# Patient Record
Sex: MALE | Race: WHITE | Employment: FULL TIME | URBAN - METROPOLITAN AREA
[De-identification: names, ages, dates, MRNs, and addresses within clinical notes are randomized per-mention and may not be internally consistent; named-entity substitution may affect disease eponyms.]

---

## 2023-02-27 ENCOUNTER — HOSPITAL ENCOUNTER (EMERGENCY)
Age: 68
Discharge: HOME | End: 2023-02-27
Attending: EMERGENCY MEDICINE
Payer: MEDICARE

## 2023-02-27 VITALS
HEART RATE: 108 BPM | DIASTOLIC BLOOD PRESSURE: 85 MMHG | HEIGHT: 71 IN | TEMPERATURE: 97.6 F | WEIGHT: 185.63 LBS | RESPIRATION RATE: 16 BRPM | OXYGEN SATURATION: 94 % | BODY MASS INDEX: 25.99 KG/M2 | SYSTOLIC BLOOD PRESSURE: 148 MMHG

## 2023-02-27 DIAGNOSIS — N40.1 URINARY RETENTION DUE TO BENIGN PROSTATIC HYPERPLASIA: Primary | ICD-10-CM

## 2023-02-27 DIAGNOSIS — R33.8 URINARY RETENTION DUE TO BENIGN PROSTATIC HYPERPLASIA: Primary | ICD-10-CM

## 2023-02-27 PROCEDURE — 99283 EMERGENCY DEPT VISIT LOW MDM: CPT

## 2023-02-27 RX ORDER — SULFAMETHOXAZOLE AND TRIMETHOPRIM 800; 160 MG/1; MG/1
TABLET ORAL
COMMUNITY
Start: 2023-02-27

## 2023-02-27 RX ORDER — FINASTERIDE 5 MG/1
TABLET, FILM COATED ORAL
COMMUNITY
Start: 2023-02-27

## 2023-02-27 RX ORDER — TAMSULOSIN HYDROCHLORIDE 0.4 MG/1
CAPSULE ORAL
COMMUNITY
Start: 2023-02-27

## 2023-02-27 NOTE — Clinical Note
[facility]  [address]  [address]  [phone]    Work/School Note    Date: 2/27/2023    To Whom It May concern:    Vernon Nguyen was seen and treated today in the emergency room by the following provider(s):  Attending Provider: Maria Ines Mccarty MD.      Vernon Nguyen is excused from work/school on 02/27/23 and 02/28/23. He is medically clear to return to work/school on 3/1/2023.        Sincerely,          Erik Daigle MD

## 2023-02-27 NOTE — Clinical Note
[facility]  [address]  [address]  [phone]    Work/School Note    Date: 2/27/2023    To Whom It May concern:    Joel Verma was seen and treated today in the emergency room by the following provider(s):  Attending Provider: Kun Simmons MD.      Joel Verma is excused from work/school on 02/27/23 and 02/28/23. He is medically clear to return to work/school on 3/1/2023.        Sincerely,          Mariaa Pennington MD

## 2023-02-28 NOTE — ED TRIAGE NOTES
Triage note:[facility] today dr. Brad Rand started on flowmax has not been able to void well x 4 days was in sever bladder pain x 6 hours and unable to void.

## 2023-02-28 NOTE — ED PROVIDER NOTES
History of Present Illness:  [facility] dr. Talib Brennan started on flowmax has not been able to void well x 4 days was in sever bladder pain x 6 hours and unable to void. History reviewed. No pertinent past medical history. Past Surgical History:   Procedure Laterality Date    HX LUMBAR DISKECTOMY           History reviewed. No pertinent family history. Social History     Socioeconomic History    Marital status: SINGLE     Spouse name: Not on file    Number of children: Not on file    Years of education: Not on file    Highest education level: Not on file   Occupational History    Not on file   Tobacco Use    Smoking status: Never    Smokeless tobacco: Not on file   Substance and Sexual Activity    Alcohol use: Yes    Drug use: No    Sexual activity: Not on file   Other Topics Concern    Not on file   Social History Narrative    Not on file     Social Determinants of Health     Financial Resource Strain: Not on file   Food Insecurity: Not on file   Transportation Needs: Not on file   Physical Activity: Not on file   Stress: Not on file   Social Connections: Not on file   Intimate Partner Violence: Not on file   Housing Stability: Not on file         ALLERGIES: Patient has no known allergies. Review of Systems   Gastrointestinal:  Positive for abdominal distention and abdominal pain. Genitourinary:  Positive for decreased urine volume and difficulty urinating. All other systems reviewed and are negative. Vitals:    02/27/23 2100 02/27/23 2125 02/27/23 2202   BP: (!) 168/92  (!) 148/85   Pulse: (!) 118 (!) 108    Resp: 16     Temp: 97.6 °F (36.4 °C)     SpO2: 99% 94%    Weight: 84.2 kg (185 lb 10 oz)     Height: 5' 11\" (1.803 m)              Physical Exam  Vitals and nursing note reviewed. Constitutional:       General: He is in acute distress. Appearance: Normal appearance. He is normal weight. He is not ill-appearing, toxic-appearing or diaphoretic.    HENT:      Head: Normocephalic and atraumatic. Right Ear: External ear normal.      Left Ear: External ear normal.      Nose: Nose normal. No congestion or rhinorrhea. Mouth/Throat:      Mouth: Mucous membranes are moist.      Pharynx: Oropharynx is clear. No oropharyngeal exudate or posterior oropharyngeal erythema. Eyes:      General: No scleral icterus. Right eye: No discharge. Left eye: No discharge. Extraocular Movements: Extraocular movements intact. Conjunctiva/sclera: Conjunctivae normal.      Pupils: Pupils are equal, round, and reactive to light. Cardiovascular:      Rate and Rhythm: Normal rate and regular rhythm. Pulses: Normal pulses. Heart sounds: No murmur heard. No gallop. Pulmonary:      Effort: Pulmonary effort is normal. No respiratory distress. Breath sounds: Normal breath sounds. No stridor. No wheezing, rhonchi or rales. Chest:      Chest wall: No tenderness. Abdominal:      General: Abdomen is flat. Bowel sounds are normal. There is distension. Palpations: Abdomen is soft. Tenderness: There is abdominal tenderness. There is no guarding or rebound. Genitourinary:     Penis: Normal.       Testes: Normal.   Musculoskeletal:         General: No swelling or tenderness. Normal range of motion. Cervical back: Normal range of motion and neck supple. No rigidity or tenderness. Right lower leg: No edema. Left lower leg: No edema. Lymphadenopathy:      Cervical: No cervical adenopathy. Skin:     General: Skin is warm. Capillary Refill: Capillary refill takes less than 2 seconds. Findings: No erythema or rash. Neurological:      General: No focal deficit present. Mental Status: He is alert and oriented to person, place, and time. Mental status is at baseline. Cranial Nerves: No cranial nerve deficit. Motor: No weakness.       Gait: Gait normal.   Psychiatric:         Mood and Affect: Mood normal.         Behavior: Behavior normal.         Thought Content: Thought content normal.         Judgment: Judgment normal.        No results found for this or any previous visit (from the past 72 hour(s)). No orders to display       Medical Decision Making  Acute urinary retention due to benign prostatic hypertrophy, prostatitis, urinary tract infection and other causes      Problems Addressed:  Urinary retention due to benign prostatic hyperplasia: complicated acute illness or injury    Amount and/or Complexity of Data Reviewed  External Data Reviewed: notes. Details: Prior visits for PCP visits, refills, urinary urgency, sinusitis and anxiety  Discussion of management or test interpretation with external provider(s): The nurse placed a Matta catheter and nearly 1000 cc of urine was drained patient had a dramatic relief    Risk  OTC drugs. Prescription drug management. Decision regarding hospitalization. Minor surgery with no identified risk factors. Risk Details: The patient [facility] and at his [facility]rologist office and culture was also performed. For this reason there is no indication to repeat the urinalysis and the patient is already on antibiotics and was instructed to continue his antibiotics. He was also already on Flomax and finasteride and was instructed to continue these. He has an appointment tomorrow with his urologist which she has been instructed to keep. The patient ultiumately did not warrant hospitalization nor any acute surgical intervention, I considered the need for both. Also considered the need to obtain additional imaging and/or labs beyond what may have been ordered but no additional testing was indicated based on the patient's condition and results of any other testing that may have been performed. Any medications given here and/or prescribed for discharge are documented within the other portions of the note.   After any medications or treatments that may have been provided here the patient was improved and symptoms had resolved or become tolerable or no medications or treatments were indicated aced on the patient's condition. The patient was deemed stable safe and appropriate for discharge to home and is instructed to follow-up with his primary care doctor and return for any new or worsening symptoms. Procedures    Medications - No data to display       My Medications        ASK your doctor about these medications        Instructions Each Dose to Equal Morning Noon Evening Bedtime   finasteride 5 mg tablet  Commonly known as: PROSCAR    Your last dose was: Your next dose is:                         tamsulosin 0.4 mg capsule  Commonly known as: FLOMAX    Your last dose was: Your next dose is:                         trimethoprim-sulfamethoxazole 160-800 mg per tablet  Commonly known as: BACTRIM DS, SEPTRA DS    Your last dose was: Your next dose is:                                  Encounter Diagnoses     ICD-10-CM ICD-9-CM   1.  Urinary retention due to benign prostatic hyperplasia  N40.1 600.91    R33.8 788.20       Follow-up Information       Follow up With Specialties Details Why Contact Info    [facility] EM[facility] Emergency Medicine  As needed, If symptoms worsen [place] [phone]    your urologist   as scheduled tomorrow morning             DISPOSITION:  Discharged

## 2023-02-28 NOTE — DISCHARGE INSTRUCTIONS
Keep taking the antibiotics and medications that you have already been prescribed for this condition. Follow-up with your urologist tomorrow as scheduled. Return for any new or worsening symptoms.

## 2023-04-23 ENCOUNTER — HOSPITAL ENCOUNTER (OUTPATIENT)
Dept: MRI IMAGING | Age: 68
Discharge: HOME | End: 2023-04-23
Attending: UROLOGY
Payer: MEDICARE

## 2023-04-23 DIAGNOSIS — R97.20 ELEVATED PSA: ICD-10-CM

## 2023-04-23 PROCEDURE — 72197 MRI PELVIS W/O & W/DYE: CPT

## 2023-04-23 PROCEDURE — A9576 INJ PROHANCE MULTIPACK: HCPCS

## 2023-04-23 PROCEDURE — 74011250636 HC RX REV CODE- 250/636

## 2023-04-23 PROCEDURE — 74011000250 HC RX REV CODE- 250: Performed by: UROLOGY

## 2023-04-23 PROCEDURE — 74011000258 HC RX REV CODE- 258: Performed by: UROLOGY

## 2023-04-23 RX ORDER — SODIUM CHLORIDE 0.9 % (FLUSH) 0.9 %
10 SYRINGE (ML) INJECTION
Status: COMPLETED | OUTPATIENT
Start: 2023-04-23 | End: 2023-04-23

## 2023-04-23 RX ADMIN — SODIUM CHLORIDE 100 ML: 9 INJECTION, SOLUTION INTRAVENOUS at 10:49

## 2023-04-23 RX ADMIN — GADOTERIDOL 18 ML: 279.3 INJECTION, SOLUTION INTRAVENOUS at 10:48

## 2023-04-23 RX ADMIN — SODIUM CHLORIDE, PRESERVATIVE FREE 10 ML: 5 INJECTION INTRAVENOUS at 10:48
